# Patient Record
Sex: FEMALE | Race: WHITE | NOT HISPANIC OR LATINO | Employment: UNEMPLOYED
[De-identification: names, ages, dates, MRNs, and addresses within clinical notes are randomized per-mention and may not be internally consistent; named-entity substitution may affect disease eponyms.]

---

## 2021-05-02 ENCOUNTER — HEALTH MAINTENANCE LETTER (OUTPATIENT)
Age: 51
End: 2021-05-02

## 2021-10-17 ENCOUNTER — HEALTH MAINTENANCE LETTER (OUTPATIENT)
Age: 51
End: 2021-10-17

## 2022-06-05 ENCOUNTER — HEALTH MAINTENANCE LETTER (OUTPATIENT)
Age: 52
End: 2022-06-05

## 2022-07-05 ENCOUNTER — HOSPITAL ENCOUNTER (EMERGENCY)
Facility: CLINIC | Age: 52
Discharge: HOME | End: 2022-07-05
Attending: EMERGENCY MEDICINE | Admitting: EMERGENCY MEDICINE
Payer: COMMERCIAL

## 2022-07-05 VITALS
RESPIRATION RATE: 18 BRPM | OXYGEN SATURATION: 98 % | HEART RATE: 97 BPM | WEIGHT: 145 LBS | HEIGHT: 67 IN | DIASTOLIC BLOOD PRESSURE: 83 MMHG | BODY MASS INDEX: 22.76 KG/M2 | SYSTOLIC BLOOD PRESSURE: 120 MMHG | TEMPERATURE: 99.2 F
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
## 2022-07-05 PROCEDURE — 99282 EMERGENCY DEPT VISIT SF MDM: CPT | Mod: 25

## 2022-07-05 PROCEDURE — 250N000011 HC RX IP 250 OP 636: Performed by: EMERGENCY MEDICINE

## 2022-07-05 PROCEDURE — 90471 IMMUNIZATION ADMIN: CPT | Performed by: EMERGENCY MEDICINE

## 2022-07-05 PROCEDURE — 90715 TDAP VACCINE 7 YRS/> IM: CPT | Performed by: EMERGENCY MEDICINE

## 2022-07-05 RX ADMIN — CLOSTRIDIUM TETANI TOXOID ANTIGEN (FORMALDEHYDE INACTIVATED), CORYNEBACTERIUM DIPHTHERIAE TOXOID ANTIGEN (FORMALDEHYDE INACTIVATED), BORDETELLA PERTUSSIS TOXOID ANTIGEN (GLUTARALDEHYDE INACTIVATED), BORDETELLA PERTUSSIS FILAMENTOUS HEMAGGLUTININ ANTIGEN (FORMALDEHYDE INACTIVATED), BORDETELLA PERTUSSIS PERTACTIN ANTIGEN, AND BORDETELLA PERTUSSIS FIMBRIAE 2/3 ANTIGEN 0.5 ML: 5; 2; 2.5; 5; 3; 5 INJECTION, SUSPENSION INTRAMUSCULAR at 11:09

## 2022-07-05 NOTE — ED PROVIDER NOTES
"  History   Chief Complaint:  Mouse Bite     HPI   Gabriella Garrett is a 52 year old female who presents for evaluation of a mouse bite. This morning shortly prior to arrival the patient was bitten on her left thumb by an unknown mouse in her house. She was wearing gloves at the time. She presents to the ED with concern for this exposure. She denies any other injury. Her tetanus status was last updated 3/12/2012.      Review of Systems   Skin:        (+) Mouse bite, left thumb    All other systems reviewed and are negative.    Allergies:  Ketoconazole     Medications:  Adderall   Xanax  Ativan   Gabapentin  Klonopin  Quasense   Fioricet      Past Medical History:     Chronic midline thoracic back pain  Chronic tension-type headache  Degenerative disc disease, cervical   Occipital neuralgia      Past Surgical History:    Appendectomy  Excise lesion sacral      Family History:    Stroke - Father   Dementia - Father   Prostate cancer - Father   Hypertension - Mother   Pulmonary embolism - Mother      Social History:  The patient presents to the ED alone.   Tetanus last updated 3/12/2012.     Physical Exam     Patient Vitals for the past 24 hrs:   BP Temp Temp src Pulse Resp SpO2 Height Weight   07/05/22 0944 120/83 99.2  F (37.3  C) Temporal 97 18 98 % 1.702 m (5' 7\") 65.8 kg (145 lb)       Physical Exam  Eyes:  The pupils are equal and round    Conjunctivae and sclerae are normal  ENT:    The nose is normal    Pinnae are normal  Neck:  Normal range of motion    There is no rigidity noted  CV:  Normal capillary refill in thumb  Resp:  Lungs are clear    Non-labored    No rales    No wheezing   MS:  Normal muscular tone    No asymmetric leg swelling  Skin:  No clear wound on the left thumb in area of mouse bite  Neuro:   Awake, alert.      Speech is normal and fluent.    Face is symmetric.     Moves all extremities     SILT in the left thumb    Emergency Department Course     Emergency Department Course:   "   Reviewed:  I reviewed nursing notes, vitals and past medical history    Assessments:  1055:  I obtained history and examined the patient as noted above.     Interventions:  1109 Tdap 0.5 mL IM     Disposition:  The patient was discharged to home.     Impression & Plan       Medical Decision Making:  Gabriella Garrett is a 52 year old female who presents to the emergency department with a mouse bite that occurred about two hours prior to arrival to her left thumb. She was wearing gloves. She had pain in the area for a short time after the mouse bite, but pain is now resolved. No bleeding. On exam I cannot see a definite puncture site. Discussed benefits and risks of antibiotic prophylaxis due to the rodent bite and ultimately decided to initiate prophylaxis. according to MD no concern for rabies with a mouse bite. Discussed warning signs and return precautions. Her tetanus status was updated and she was discharged to home.        Diagnosis:    ICD-10-CM    1. Bitten by mouse, initial encounter  W53.01XA        Discharge Medications:  New Prescriptions    AMOXICILLIN-CLAVULANATE (AUGMENTIN) 875-125 MG TABLET    Take 1 tablet by mouth 2 times daily for 3 days       Scribe Disclosure:  I, Reymundo Meza, am serving as a scribe at 10:45 AM on 7/5/2022 to document services personally performed by Barrie Calderon MD based on my observations and the provider's statements to me.           Barrie Calderon MD  07/05/22 1601     (0) Normal; no sensory loss

## 2022-10-15 ENCOUNTER — HEALTH MAINTENANCE LETTER (OUTPATIENT)
Age: 52
End: 2022-10-15

## 2023-06-11 ENCOUNTER — HEALTH MAINTENANCE LETTER (OUTPATIENT)
Age: 53
End: 2023-06-11

## 2024-01-19 ENCOUNTER — ANCILLARY PROCEDURE (OUTPATIENT)
Dept: GENERAL RADIOLOGY | Facility: CLINIC | Age: 54
End: 2024-01-19
Attending: INTERNAL MEDICINE
Payer: COMMERCIAL

## 2024-01-19 DIAGNOSIS — R05.9 COUGH: ICD-10-CM

## 2024-01-19 PROCEDURE — 71046 X-RAY EXAM CHEST 2 VIEWS: CPT

## 2024-08-04 ENCOUNTER — HEALTH MAINTENANCE LETTER (OUTPATIENT)
Age: 54
End: 2024-08-04

## 2025-08-16 ENCOUNTER — HEALTH MAINTENANCE LETTER (OUTPATIENT)
Age: 55
End: 2025-08-16